# Patient Record
Sex: MALE | Race: NATIVE HAWAIIAN OR OTHER PACIFIC ISLANDER | NOT HISPANIC OR LATINO | Employment: OTHER | URBAN - METROPOLITAN AREA
[De-identification: names, ages, dates, MRNs, and addresses within clinical notes are randomized per-mention and may not be internally consistent; named-entity substitution may affect disease eponyms.]

---

## 2021-05-13 ENCOUNTER — HOSPITAL ENCOUNTER (EMERGENCY)
Facility: MEDICAL CENTER | Age: 31
End: 2021-05-13
Attending: EMERGENCY MEDICINE

## 2021-05-13 VITALS
WEIGHT: 222.66 LBS | HEART RATE: 63 BPM | SYSTOLIC BLOOD PRESSURE: 133 MMHG | TEMPERATURE: 97.4 F | BODY MASS INDEX: 34.95 KG/M2 | OXYGEN SATURATION: 94 % | HEIGHT: 67 IN | RESPIRATION RATE: 16 BRPM | DIASTOLIC BLOOD PRESSURE: 76 MMHG

## 2021-05-13 DIAGNOSIS — T16.1XXA ACUTE FOREIGN BODY OF EARLOBE, RIGHT, INITIAL ENCOUNTER: ICD-10-CM

## 2021-05-13 PROCEDURE — 99282 EMERGENCY DEPT VISIT SF MDM: CPT

## 2021-05-13 PROCEDURE — 10120 INC&RMVL FB SUBQ TISS SMPL: CPT

## 2021-05-13 PROCEDURE — 700101 HCHG RX REV CODE 250: Performed by: EMERGENCY MEDICINE

## 2021-05-13 RX ORDER — LIDOCAINE HYDROCHLORIDE 20 MG/ML
20 INJECTION, SOLUTION INFILTRATION; PERINEURAL ONCE
Status: COMPLETED | OUTPATIENT
Start: 2021-05-13 | End: 2021-05-13

## 2021-05-13 RX ADMIN — LIDOCAINE HYDROCHLORIDE 20 ML: 20 INJECTION, SOLUTION INFILTRATION; PERINEURAL at 12:00

## 2021-05-13 NOTE — ED PROVIDER NOTES
ED Provider Note    CHIEF COMPLAINT  Chief Complaint   Patient presents with   • Ear Pain       HPI  Dionisio Bey III is a 31 y.o. male who presents planing of a foreign body in his right earlobe.  The patient states that he was changing out his earring and pulled the backing into his ear lobe.  He was unable to remove it himself.  He waited a week to see if it would fester out and it is not coming out.  The patient denies any swelling of the earlobe or drainage.  Has no history of diabetes or poor wound healing.    REVIEW OF SYSTEMS  See HPI for further details. All other systems are negative.     PAST MEDICAL HISTORY  No past medical history on file.    FAMILY HISTORY  No family history on file.    SOCIAL HISTORY  Social History     Socioeconomic History   • Marital status: Single     Spouse name: Not on file   • Number of children: Not on file   • Years of education: Not on file   • Highest education level: Not on file   Occupational History   • Not on file   Tobacco Use   • Smoking status: Never Smoker   Substance and Sexual Activity   • Alcohol use: No   • Drug use: No   • Sexual activity: Not on file   Other Topics Concern   • Not on file   Social History Narrative   • Not on file     Social Determinants of Health     Financial Resource Strain:    • Difficulty of Paying Living Expenses:    Food Insecurity:    • Worried About Running Out of Food in the Last Year:    • Ran Out of Food in the Last Year:    Transportation Needs:    • Lack of Transportation (Medical):    • Lack of Transportation (Non-Medical):    Physical Activity:    • Days of Exercise per Week:    • Minutes of Exercise per Session:    Stress:    • Feeling of Stress :    Social Connections:    • Frequency of Communication with Friends and Family:    • Frequency of Social Gatherings with Friends and Family:    • Attends [employer] Services:    • Active Member of Clubs or Organizations:    • Attends Club or Organization Meetings:    • Marital  "Status:    Intimate Partner Violence:    • Fear of Current or Ex-Partner:    • Emotionally Abused:    • Physically Abused:    • Sexually Abused:        SURGICAL HISTORY  No past surgical history on file.    CURRENT MEDICATIONS  Home Medications     Reviewed by Gudelia Mendoza R.N. (Registered Nurse) on 05/13/21 at 1031  Med List Status: Partial   Medication Last Dose Status        Patient Miles Taking any Medications                       ALLERGIES  No Known Allergies    PHYSICAL EXAM  VITAL SIGNS: /87   Pulse 74   Temp 36.1 °C (97 °F) (Temporal)   Resp 16   Ht 1.702 m (5' 7\")   Wt 101 kg (222 lb 10.6 oz)   SpO2 94%   BMI 34.87 kg/m²       Constitutional:  Well developed, Well nourished, No acute distress, Non-toxic appearance.   HENT: Positive foreign body palpated in the right earlobe no erythema or purulent drainage no ear swelling  Eyes: PERRLA, EOMI, Conjunctiva normal, No discharge.   Neck: Normal range of motion, No tenderness, Supple, No stridor.   Lymphatic: No lymphadenopathy noted.   Skin: Warm, Dry, No erythema, No rash.   Extremities: Full range of motion of all extremities without any deformities.   Neurologic: Alert & oriented x 3,No focal deficits noted.   Psychiatric: Affect normal, Judgment normal, Mood normal.       COURSE & MEDICAL DECISION MAKING  Pertinent Labs & Imaging studies reviewed. (See chart for details)  Foreign Body Removal Procedure Note    Indication: Foreign body under the skin    Procedure: The area of the foreign body was prepped with betadine and draped in a sterile fashion. Local anesthesia over the foreign body site was obtained by infiltration using 2% Lidocaine without epinephrine.  The foreign body was then removed using a scalpel and a hemostat and had the appearance of metal ring backing.  After the procedure the area was dressed with a sterile dressing. The patient's tetanus status was up to date and did not require a booster dose.    The patient tolerated " the procedure well.    Complications: None      Vies the patient to wait a few months until the area is completely cleaned to have his ear pierced again.  He should return immediately for any signs of infection.    The patient will return for new or worsening symptoms and is stable at the time of discharge.    The patient is referred to a primary physician for blood pressure management, diabetic screening, and for all other preventative health concerns.      DISPOSITION:  Patient will be discharged home in stable condition.    FOLLOW UP:  No follow-up provider specified.    OUTPATIENT MEDICATIONS:  New Prescriptions    No medications on file         FINAL IMPRESSION  1. Acute foreign body of earlobe, right, initial encounter          Electronically signed by: Alma Madden M.D., 5/13/2021 10:42 AM

## 2021-05-13 NOTE — ED TRIAGE NOTES
Pt ambulates to triage  Chief Complaint   Patient presents with   • Ear Pain   earring back stuck to right ear, earring been in place x 1 week    Pt A & 0 x 4, speech clear, ambulates well    Pt updated on triage process and asked to inform RN of any changes while waiting in lobby.

## 2022-06-10 ENCOUNTER — APPOINTMENT (OUTPATIENT)
Dept: RADIOLOGY | Facility: IMAGING CENTER | Age: 32
End: 2022-06-10
Attending: NURSE PRACTITIONER

## 2022-06-10 ENCOUNTER — OFFICE VISIT (OUTPATIENT)
Dept: URGENT CARE | Facility: CLINIC | Age: 32
End: 2022-06-10
Payer: MEDICAID

## 2022-06-10 VITALS
TEMPERATURE: 98.2 F | OXYGEN SATURATION: 93 % | SYSTOLIC BLOOD PRESSURE: 144 MMHG | RESPIRATION RATE: 18 BRPM | BODY MASS INDEX: 29.55 KG/M2 | HEIGHT: 68 IN | DIASTOLIC BLOOD PRESSURE: 96 MMHG | WEIGHT: 195 LBS | HEART RATE: 77 BPM

## 2022-06-10 DIAGNOSIS — S89.91XA INJURY OF RIGHT KNEE, INITIAL ENCOUNTER: ICD-10-CM

## 2022-06-10 DIAGNOSIS — S86.811A RUPTURE OF RIGHT PATELLAR TENDON, INITIAL ENCOUNTER: ICD-10-CM

## 2022-06-10 PROCEDURE — 73564 X-RAY EXAM KNEE 4 OR MORE: CPT | Mod: TC,FY,RT | Performed by: RADIOLOGY

## 2022-06-10 PROCEDURE — 99203 OFFICE O/P NEW LOW 30 MIN: CPT | Performed by: NURSE PRACTITIONER

## 2022-06-10 NOTE — PROGRESS NOTES
"Subjective     Ottoniel Bey III is a 32 y.o. male who presents with Knee Injury (X4days, Right knee, pain, swelling, bruising, hard to walk on it )            Knee Injury  This is a new problem. Episode onset: pt reports new onset of right knee injury that he sustained 4 days ago while dancing in [place] at a [employer] party. he states he hit his right knee against someone else's knee and this caused pain, swelling and limited mobility.  The problem has been unchanged. Exacerbated by: bending knee. He has tried ice, rest, immobilization and NSAIDs (elevation. using crutches to ambulate) for the symptoms. The treatment provided mild relief.       Review of Systems   Musculoskeletal: Positive for joint pain.   All other systems reviewed and are negative.         History reviewed. No pertinent past medical history. History reviewed. No pertinent surgical history.   Social History     Socioeconomic History   • Marital status: Single     Spouse name: Not on file   • Number of children: Not on file   • Years of education: Not on file   • Highest education level: Not on file   Occupational History   • Not on file   Tobacco Use   • Smoking status: Never Smoker   • Smokeless tobacco: Never Used   Vaping Use   • Vaping Use: Never used   Substance and Sexual Activity   • Alcohol use: Yes     Comment: once a week   • Drug use: No   • Sexual activity: Not on file   Other Topics Concern   • Not on file   Social History Narrative   • Not on file     Social Determinants of Health     Financial Resource Strain: Not on file   Food Insecurity: Not on file   Transportation Needs: Not on file   Physical Activity: Not on file   Stress: Not on file   Social Connections: Not on file   Intimate Partner Violence: Not on file   Housing Stability: Not on file         Objective     BP (!) 144/96   Pulse 77   Temp 36.8 °C (98.2 °F) (Temporal)   Resp 18   Ht 1.727 m (5' 8\")   Wt 88.5 kg (195 lb)   SpO2 93%   BMI 29.65 kg/m²      Physical " Exam  Vitals and nursing note reviewed.   Constitutional:       Appearance: Normal appearance.   HENT:      Head: Normocephalic and atraumatic.      Nose: Nose normal.      Mouth/Throat:      Mouth: Mucous membranes are moist.      Pharynx: Oropharynx is clear.   Eyes:      Extraocular Movements: Extraocular movements intact.      Pupils: Pupils are equal, round, and reactive to light.   Cardiovascular:      Rate and Rhythm: Normal rate and regular rhythm.   Pulmonary:      Effort: Pulmonary effort is normal.   Musculoskeletal:         General: Normal range of motion.      Cervical back: Normal range of motion and neck supple.      Right knee: Effusion present. Tenderness present over the medial joint line.   Skin:     General: Skin is warm and dry.      Capillary Refill: Capillary refill takes less than 2 seconds.   Neurological:      General: No focal deficit present.      Mental Status: He is alert and oriented to person, place, and time. Mental status is at baseline.   Psychiatric:         Mood and Affect: Mood normal.         Thought Content: Thought content normal.         Judgment: Judgment normal.                  DX-KNEE COMPLETE 4+ RIGHT  Narrative: HISTORY/REASON FOR EXAM:  Pain/Deformity Following Trauma    TECHNIQUE/EXAM DESCRIPTION AND NUMBER OF VIEWS:  4 views of the left knee, 6/10/2022 12:44 PM.    COMPARISON: None    FINDINGS:    The patella is positioned superiorly. There is infrapatellar soft tissue swelling. There is probable joint effusion. No fracture identified.  Impression: 1. High positioning of the patella with infrapatellar soft tissue swelling suggesting patellar tendon tear/rupture.    INTERPRETING LOCATION:  [address] [place] [address] [place], [id number]               Assessment & Plan           1. Injury of right knee, initial encounter  - DX-KNEE COMPLETE 4+ RIGHT; Future    2. Rupture of right patellar tendon, initial encounter  - Referral to establish with [facility] PCP    Discussed with patient xray  findings  He will need to follow up with Orthopedic surgeon. Since he does not have insurance, he can go to any ortho specialist  Continue to wear knee brace and utilize crutches  RICE   Tylenol and ibuprofen as needed for pain       Discussed elevated BP with patient today. Patient agrees with plan to institute proper dietary modifications to lower BP.   Referral placed to establish care with a PCP  Follow up with PCP    Supportive care, differential diagnoses, and indications for immediate follow-up discussed with patient.    Pathogenesis of diagnosis discussed including typical length and natural progression.      Instructed to return to  or nearest emergency department if symptoms fail to improve, for any change in condition, further concerns, or new concerning symptoms.  Patient states understanding of the plan of care and discharge instructions.

## 2022-06-14 ENCOUNTER — ANESTHESIA EVENT (OUTPATIENT)
Dept: SURGERY | Facility: MEDICAL CENTER | Age: 32
End: 2022-06-14
Payer: MEDICAID

## 2022-06-14 ENCOUNTER — HOSPITAL ENCOUNTER (OUTPATIENT)
Facility: MEDICAL CENTER | Age: 32
End: 2022-06-14
Attending: ORTHOPAEDIC SURGERY | Admitting: ORTHOPAEDIC SURGERY
Payer: MEDICAID

## 2022-06-14 ENCOUNTER — ANESTHESIA (OUTPATIENT)
Dept: SURGERY | Facility: MEDICAL CENTER | Age: 32
End: 2022-06-14
Payer: MEDICAID

## 2022-06-14 VITALS
SYSTOLIC BLOOD PRESSURE: 152 MMHG | BODY MASS INDEX: 31.14 KG/M2 | HEIGHT: 67 IN | OXYGEN SATURATION: 93 % | TEMPERATURE: 97.5 F | HEART RATE: 54 BPM | RESPIRATION RATE: 16 BRPM | DIASTOLIC BLOOD PRESSURE: 76 MMHG | WEIGHT: 198.41 LBS

## 2022-06-14 PROCEDURE — 160036 HCHG PACU - EA ADDL 30 MINS PHASE I: Performed by: ORTHOPAEDIC SURGERY

## 2022-06-14 PROCEDURE — 160029 HCHG SURGERY MINUTES - 1ST 30 MINS LEVEL 4: Performed by: ORTHOPAEDIC SURGERY

## 2022-06-14 PROCEDURE — 160025 RECOVERY II MINUTES (STATS): Performed by: ORTHOPAEDIC SURGERY

## 2022-06-14 PROCEDURE — 160009 HCHG ANES TIME/MIN: Performed by: ORTHOPAEDIC SURGERY

## 2022-06-14 PROCEDURE — 700111 HCHG RX REV CODE 636 W/ 250 OVERRIDE (IP): Performed by: ANESTHESIOLOGY

## 2022-06-14 PROCEDURE — 160046 HCHG PACU - 1ST 60 MINS PHASE II: Performed by: ORTHOPAEDIC SURGERY

## 2022-06-14 PROCEDURE — 160035 HCHG PACU - 1ST 60 MINS PHASE I: Performed by: ORTHOPAEDIC SURGERY

## 2022-06-14 PROCEDURE — 01320 ANES ALL PX NRV MSC KNE&/POP: CPT | Performed by: ANESTHESIOLOGY

## 2022-06-14 PROCEDURE — 160041 HCHG SURGERY MINUTES - EA ADDL 1 MIN LEVEL 4: Performed by: ORTHOPAEDIC SURGERY

## 2022-06-14 PROCEDURE — 700101 HCHG RX REV CODE 250: Performed by: ANESTHESIOLOGY

## 2022-06-14 PROCEDURE — 700111 HCHG RX REV CODE 636 W/ 250 OVERRIDE (IP): Performed by: ORTHOPAEDIC SURGERY

## 2022-06-14 PROCEDURE — 160002 HCHG RECOVERY MINUTES (STAT): Performed by: ORTHOPAEDIC SURGERY

## 2022-06-14 PROCEDURE — 700105 HCHG RX REV CODE 258: Performed by: ORTHOPAEDIC SURGERY

## 2022-06-14 PROCEDURE — 160048 HCHG OR STATISTICAL LEVEL 1-5: Performed by: ORTHOPAEDIC SURGERY

## 2022-06-14 RX ORDER — SODIUM CHLORIDE, SODIUM LACTATE, POTASSIUM CHLORIDE, CALCIUM CHLORIDE 600; 310; 30; 20 MG/100ML; MG/100ML; MG/100ML; MG/100ML
INJECTION, SOLUTION INTRAVENOUS CONTINUOUS
Status: CANCELLED | OUTPATIENT
Start: 2022-06-14 | End: 2022-06-14

## 2022-06-14 RX ORDER — KETOROLAC TROMETHAMINE 30 MG/ML
INJECTION, SOLUTION INTRAMUSCULAR; INTRAVENOUS PRN
Status: DISCONTINUED | OUTPATIENT
Start: 2022-06-14 | End: 2022-06-14 | Stop reason: SURG

## 2022-06-14 RX ORDER — BUPIVACAINE HYDROCHLORIDE 2.5 MG/ML
INJECTION, SOLUTION EPIDURAL; INFILTRATION; INTRACAUDAL
Status: DISCONTINUED | OUTPATIENT
Start: 2022-06-14 | End: 2022-06-14 | Stop reason: HOSPADM

## 2022-06-14 RX ORDER — ONDANSETRON 2 MG/ML
4 INJECTION INTRAMUSCULAR; INTRAVENOUS
Status: DISCONTINUED | OUTPATIENT
Start: 2022-06-14 | End: 2022-06-14 | Stop reason: HOSPADM

## 2022-06-14 RX ORDER — HALOPERIDOL 5 MG/ML
1 INJECTION INTRAMUSCULAR
Status: DISCONTINUED | OUTPATIENT
Start: 2022-06-14 | End: 2022-06-14 | Stop reason: HOSPADM

## 2022-06-14 RX ORDER — OXYCODONE HCL 5 MG/5 ML
10 SOLUTION, ORAL ORAL
Status: DISCONTINUED | OUTPATIENT
Start: 2022-06-14 | End: 2022-06-14 | Stop reason: HOSPADM

## 2022-06-14 RX ORDER — DIPHENHYDRAMINE HYDROCHLORIDE 50 MG/ML
12.5 INJECTION INTRAMUSCULAR; INTRAVENOUS
Status: DISCONTINUED | OUTPATIENT
Start: 2022-06-14 | End: 2022-06-14 | Stop reason: HOSPADM

## 2022-06-14 RX ORDER — ONDANSETRON 2 MG/ML
INJECTION INTRAMUSCULAR; INTRAVENOUS PRN
Status: DISCONTINUED | OUTPATIENT
Start: 2022-06-14 | End: 2022-06-14 | Stop reason: SURG

## 2022-06-14 RX ORDER — MEPERIDINE HYDROCHLORIDE 25 MG/ML
12.5 INJECTION INTRAMUSCULAR; INTRAVENOUS; SUBCUTANEOUS
Status: DISCONTINUED | OUTPATIENT
Start: 2022-06-14 | End: 2022-06-14 | Stop reason: HOSPADM

## 2022-06-14 RX ORDER — HYDROMORPHONE HYDROCHLORIDE 1 MG/ML
0.2 INJECTION, SOLUTION INTRAMUSCULAR; INTRAVENOUS; SUBCUTANEOUS
Status: DISCONTINUED | OUTPATIENT
Start: 2022-06-14 | End: 2022-06-14 | Stop reason: HOSPADM

## 2022-06-14 RX ORDER — HYDROMORPHONE HYDROCHLORIDE 1 MG/ML
0.1 INJECTION, SOLUTION INTRAMUSCULAR; INTRAVENOUS; SUBCUTANEOUS
Status: DISCONTINUED | OUTPATIENT
Start: 2022-06-14 | End: 2022-06-14 | Stop reason: HOSPADM

## 2022-06-14 RX ORDER — HYDROMORPHONE HYDROCHLORIDE 1 MG/ML
0.4 INJECTION, SOLUTION INTRAMUSCULAR; INTRAVENOUS; SUBCUTANEOUS
Status: DISCONTINUED | OUTPATIENT
Start: 2022-06-14 | End: 2022-06-14 | Stop reason: HOSPADM

## 2022-06-14 RX ORDER — SODIUM CHLORIDE, SODIUM LACTATE, POTASSIUM CHLORIDE, CALCIUM CHLORIDE 600; 310; 30; 20 MG/100ML; MG/100ML; MG/100ML; MG/100ML
INJECTION, SOLUTION INTRAVENOUS CONTINUOUS
Status: ACTIVE | OUTPATIENT
Start: 2022-06-14 | End: 2022-06-14

## 2022-06-14 RX ORDER — OXYCODONE HCL 5 MG/5 ML
5 SOLUTION, ORAL ORAL
Status: DISCONTINUED | OUTPATIENT
Start: 2022-06-14 | End: 2022-06-14 | Stop reason: HOSPADM

## 2022-06-14 RX ORDER — CEFAZOLIN SODIUM 1 G/3ML
INJECTION, POWDER, FOR SOLUTION INTRAMUSCULAR; INTRAVENOUS PRN
Status: DISCONTINUED | OUTPATIENT
Start: 2022-06-14 | End: 2022-06-14 | Stop reason: SURG

## 2022-06-14 RX ORDER — METOCLOPRAMIDE HYDROCHLORIDE 5 MG/ML
INJECTION INTRAMUSCULAR; INTRAVENOUS PRN
Status: DISCONTINUED | OUTPATIENT
Start: 2022-06-14 | End: 2022-06-14 | Stop reason: SURG

## 2022-06-14 RX ORDER — ALBUTEROL SULFATE 2.5 MG/3ML
2.5 SOLUTION RESPIRATORY (INHALATION)
Status: DISCONTINUED | OUTPATIENT
Start: 2022-06-14 | End: 2022-06-14 | Stop reason: HOSPADM

## 2022-06-14 RX ORDER — DEXAMETHASONE SODIUM PHOSPHATE 4 MG/ML
INJECTION, SOLUTION INTRA-ARTICULAR; INTRALESIONAL; INTRAMUSCULAR; INTRAVENOUS; SOFT TISSUE PRN
Status: DISCONTINUED | OUTPATIENT
Start: 2022-06-14 | End: 2022-06-14 | Stop reason: SURG

## 2022-06-14 RX ADMIN — SODIUM CHLORIDE, POTASSIUM CHLORIDE, SODIUM LACTATE AND CALCIUM CHLORIDE: 600; 310; 30; 20 INJECTION, SOLUTION INTRAVENOUS at 14:55

## 2022-06-14 RX ADMIN — METOCLOPRAMIDE 10 MG: 5 INJECTION, SOLUTION INTRAMUSCULAR; INTRAVENOUS at 15:21

## 2022-06-14 RX ADMIN — MIDAZOLAM 2 MG: 1 INJECTION INTRAMUSCULAR; INTRAVENOUS at 15:22

## 2022-06-14 RX ADMIN — KETOROLAC TROMETHAMINE 30 MG: 30 INJECTION, SOLUTION INTRAMUSCULAR at 15:21

## 2022-06-14 RX ADMIN — DEXAMETHASONE SODIUM PHOSPHATE 8 MG: 4 INJECTION, SOLUTION INTRA-ARTICULAR; INTRALESIONAL; INTRAMUSCULAR; INTRAVENOUS; SOFT TISSUE at 15:21

## 2022-06-14 RX ADMIN — PROPOFOL 150 MG: 10 INJECTION, EMULSION INTRAVENOUS at 15:21

## 2022-06-14 RX ADMIN — GLYCOPYRROLATE 0.2 MG: 0.2 INJECTION INTRAMUSCULAR; INTRAVENOUS at 15:53

## 2022-06-14 RX ADMIN — FENTANYL CITRATE 100 MCG: 50 INJECTION, SOLUTION INTRAMUSCULAR; INTRAVENOUS at 15:49

## 2022-06-14 RX ADMIN — FENTANYL CITRATE 50 MCG: 50 INJECTION, SOLUTION INTRAMUSCULAR; INTRAVENOUS at 17:09

## 2022-06-14 RX ADMIN — CEFAZOLIN 2 G: 330 INJECTION, POWDER, FOR SOLUTION INTRAMUSCULAR; INTRAVENOUS at 15:21

## 2022-06-14 RX ADMIN — ONDANSETRON 4 MG: 2 INJECTION INTRAMUSCULAR; INTRAVENOUS at 15:21

## 2022-06-14 RX ADMIN — FENTANYL CITRATE 100 MCG: 50 INJECTION, SOLUTION INTRAMUSCULAR; INTRAVENOUS at 15:43

## 2022-06-14 RX ADMIN — FENTANYL CITRATE 100 MCG: 50 INJECTION, SOLUTION INTRAMUSCULAR; INTRAVENOUS at 15:28

## 2022-06-14 ASSESSMENT — PAIN DESCRIPTION - PAIN TYPE
TYPE: SURGICAL PAIN
TYPE: ACUTE PAIN

## 2022-06-14 ASSESSMENT — PAIN SCALES - GENERAL: PAIN_LEVEL: 1

## 2022-06-14 NOTE — ANESTHESIA TIME REPORT
Anesthesia Start and Stop Event Times     Date Time Event    6/14/2022 1453 Ready for Procedure     1517 Anesthesia Start     1644 Anesthesia Stop        Responsible Staff  06/14/22    Name Role Begin End    Edvin Nelson M.D. Anesth 1517 1644        Overtime Reason:  no overtime (within assigned shift)    Comments:

## 2022-06-14 NOTE — ANESTHESIA POSTPROCEDURE EVALUATION
Patient: Dionisio Bey III    Procedure Summary     Date: 06/14/22 Room / Location:  OR 06 / SURGERY [facility]    Anesthesia Start: 1517 Anesthesia Stop: 1644    Procedure: REPAIR, TENDON, PATELLAR AND QUADRICEPS - KNEE (Right Knee) Diagnosis: (RUPTURE OF PATELLAR TENDON)    Surgeons: Maryam Clancy M.D. Responsible Provider: Edvin Nelson M.D.    Anesthesia Type: general ASA Status: 1          Final Anesthesia Type: general  Last vitals  BP   Blood Pressure: (!) 152/76    Temp   37 °C (98.6 °F)    Pulse   61   Resp   20    SpO2   96 %      Anesthesia Post Evaluation    Patient location during evaluation: PACU  Patient participation: complete - patient participated  Level of consciousness: awake and alert  Pain score: 1    Airway patency: patent  Anesthetic complications: no  Cardiovascular status: hemodynamically stable  Respiratory status: acceptable  Hydration status: euvolemic    PONV: none          No complications documented.     Nurse Pain Score: 2 (NPRS)

## 2022-06-14 NOTE — OR NURSING
1643 PT RECEIVED IN PACU, REPORT RECEIVED.  VSS, RESP SPONT, EVEN, NON LABORED. PT  ARRIVES IN PRONE POSITION PER PT.     1621 PT REPOSITION TO SUPINE WITH HOB ELEVATED. PT AWAKE, REPORT R KNEE PAIN 7/10. PLAN ANALGESIA.     1731 PT REPORTS R KNEE PIAN 5/10 AND TOLERABLE. DISCUSS ORAL ANALGESIA. PT DECLINES MEDICATION NOW.  WILL TAKE HOME MEDICATION.    1755 VSS. PT MEETS CRITERIA FOR TRANSFER TO STAGE 2.

## 2022-06-14 NOTE — ANESTHESIA PROCEDURE NOTES
Airway    Date/Time: 6/14/2022 3:23 PM  Performed by: Edvin Nelson M.D.  Authorized by: Edvin Nelson M.D.     Location:  OR  Urgency:  Elective  Indications for Airway Management:  Anesthesia      Spontaneous Ventilation: absent    Sedation Level:  Deep  Preoxygenated: Yes    Final Airway Type:  Supraglottic airway  Final Supraglottic Airway:  Standard LMA    SGA Size:  3  Number of Attempts at Approach:  1

## 2022-06-14 NOTE — ANESTHESIA PREPROCEDURE EVALUATION
Case: [id number] Date/Time: 06/14/22 1406    Procedure: REPAIR, TENDON, PATELLAR AND QUADRICEPS - KNEE (Right )    Pre-op diagnosis: RUPTURE OF PATELLAR TENDON    Location: [facility] OR 06 / SURGERY [facility]    Surgeons: Maryam Clancy M.D.          Relevant Problems   No relevant active problems       Physical Exam    Airway   Mallampati: II  TM distance: >3 FB  Neck ROM: full       Cardiovascular - normal exam  Rhythm: regular  Rate: normal  (-) murmur     Dental - normal exam           Pulmonary - normal exam  Breath sounds clear to auscultation     Abdominal    Neurological - normal exam                 Anesthesia Plan    ASA 1       Plan - general       Airway plan will be LMA          Induction: intravenous    Postoperative Plan: Postoperative administration of opioids is intended.    Pertinent diagnostic labs and testing reviewed    Informed Consent:    Anesthetic plan and risks discussed with patient.    Use of blood products discussed with: patient whom consented to blood products.

## 2022-06-15 NOTE — OR NURSING
1800 Patient to phase 2 from PACU after report received      Patient dressed with help from CNA     1824 Patient education completed, family denies further questions.    1830 DC'd to care of family post uneventful stay in PACU 2.
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
## 2022-06-15 NOTE — PROGRESS NOTES
0911-post op phone call attempted. No answer I left a message and explained that if he has any concerns to call his doctor.

## 2022-06-15 NOTE — DISCHARGE INSTRUCTIONS
ACTIVITY: Rest and take it easy for the first 24 hours.  A responsible adult is recommended to remain with you during that time.  It is normal to feel sleepy.  We encourage you to not do anything that requires balance, judgment or coordination.    MILD FLU-LIKE SYMPTOMS ARE NORMAL. YOU MAY EXPERIENCE GENERALIZED MUSCLE ACHES, THROAT IRRITATION, HEADACHE AND/OR SOME NAUSEA.    FOR 24 HOURS DO NOT:  Drive, operate machinery or run household appliances.  Drink beer or alcoholic beverages.   Make important decisions or sign legal documents.    SPECIAL INSTRUCTIONS: ACTIVITY AS TOLERATED WITH IMMOBILIZER ON.  WEIGHT BEARING AS TOLERATED.      DIET: To avoid nausea, slowly advance diet as tolerated, avoiding spicy or greasy foods for the first day.  Add more substantial food to your diet according to your physician's instructions.  Babies can be fed formula or breast milk as soon as they are hungry.  INCREASE FLUIDS AND FIBER TO AVOID CONSTIPATION.    SURGICAL DRESSING/BATHING: KEEP DRESSING CLEAN AND DRY.  YOU MAY SHOWER WITH WOUND COVERED BEGINNING ON POST OPERATIVE DAY #2 (Thursday)    FOLLOW-UP APPOINTMENT:  A follow-up appointment should be arranged with your doctor ; call to schedule.    You should CALL YOUR PHYSICIAN if you develop:  Fever greater than 101 degrees F.  Pain not relieved by medication, or persistent nausea or vomiting.  Excessive bleeding (blood soaking through dressing) or unexpected drainage from the wound.  Extreme redness or swelling around the incision site, drainage of pus or foul smelling drainage.  Problems with breathing or chest pain.    You should call 911 if you develop problems with breathing or chest pain.  If you are unable to contact your doctor or surgical center, you should go to the nearest emergency room or urgent care center.      Physician's telephone #: DR. GRIFFIN  [phone]    If any questions arise, call your doctor.  If your doctor is not available, please feel free to  call the Surgical Center at [phone].     A registered nurse may call you a few days after your surgery to see how you are doing after your procedure.    MEDICATIONS: Resume taking daily medication.  Take prescribed pain medication with food.  If no medication is prescribed, you may take non-aspirin pain medication if needed.  PAIN MEDICATION CAN BE VERY CONSTIPATING.  Take a stool softener or laxative such as senokot, pericolace, or milk of magnesia if needed.    Prescription given PRE OPERATIVELY.      NO ORAL PAIN MEDICATION  GIVEN IN RECOVERY.    If your physician has prescribed pain medication that includes Acetaminophen (Tylenol), do not take additional Acetaminophen (Tylenol) while taking the prescribed medication.    Depression / Suicide Risk    As you are discharged from this Critical access hospital facility, it is important to learn how to keep safe from harming yourself.    Recognize the warning signs:  Abrupt changes in personality, positive or negative- including increase in energy   Giving away possessions  Change in eating patterns- significant weight changes-  positive or negative  Change in sleeping patterns- unable to sleep or sleeping all the time   Unwillingness or inability to communicate  Depression  Unusual sadness, discouragement and loneliness  Talk of wanting to die  Neglect of personal appearance   Rebelliousness- reckless behavior  Withdrawal from people/activities they love  Confusion- inability to concentrate     If you or a loved one observes any of these behaviors or has concerns about self-harm, here's what you can do:  Talk about it- your feelings and reasons for harming yourself  Remove any means that you might use to hurt yourself (examples: pills, rope, extension cords, firearm)  Get professional help from the community (Mental Health, Substance Abuse, psychological counseling)  Do not be alone:Call your Safe Contact- someone whom you trust who will be there for you.  Call your local  CRISIS HOTLINE 304-2939 or 553-944-2298  Call your local Children's Mobile Crisis Response Team [place] (482) 957-6289 or www.Moped.LiveStories  Call the toll free National Suicide Prevention Hotlines   National Suicide Prevention Lifeline 844-280-NSLO (2522)  National Hope Line Network 800-SUICIDE (128-9010)

## (undated) DEVICE — HEAD HOLDER JUNIOR/ADULT

## (undated) DEVICE — PACK LOWER EXTREMITY - (2/CA)

## (undated) DEVICE — GLOVE, LITE (PAIR)

## (undated) DEVICE — CANISTER SUCTION 3000ML MECHANICAL FILTER AUTO SHUTOFF MEDI-VAC NONSTERILE LF DISP  (40EA/CA)

## (undated) DEVICE — HUMID-VENT HEAT AND MOISTURE EXCHANGE- (50/BX)

## (undated) DEVICE — SUCTION INSTRUMENT YANKAUER BULBOUS TIP W/O VENT (50EA/CA)

## (undated) DEVICE — SENSOR SPO2 NEO LNCS ADHESIVE (20/BX) SEE USER NOTES

## (undated) DEVICE — SODIUM CHL IRRIGATION 0.9% 1000ML (12EA/CA)

## (undated) DEVICE — CHLORAPREP 26 ML APPLICATOR - ORANGE TINT(25/CA)

## (undated) DEVICE — PROTECTOR ULNA NERVE - (36PR/CA)

## (undated) DEVICE — SUTURE 2-0 VICRYL PLUS CT-1 - 8 X 18 INCH(12/BX)

## (undated) DEVICE — FILTER BLOOD TRANSFUSION - (40/CA) (PALL)

## (undated) DEVICE — BAG SPONGE COUNT 10.25 X 32 - BLUE (250/CA)

## (undated) DEVICE — SUTURE 0 VICRYL PLUS CT-1 - 36 INCH (36/BX)

## (undated) DEVICE — SET EXTENSION WITH 2 PORTS (48EA/CA) ***PART #2C8610 IS A SUBSTITUTE*****

## (undated) DEVICE — WRAP COBAN SELF-ADHERENT 6 IN X  5YDS STERILE TAN (12/CA)

## (undated) DEVICE — KIT ANESTHESIA W/CIRCUIT & 3/LT BAG W/FILTER (20EA/CA)

## (undated) DEVICE — NEPTUNE 4 PORT MANIFOLD - (20/PK)

## (undated) DEVICE — SUTURE GENERAL

## (undated) DEVICE — ELECTRODE DUAL RETURN W/ CORD - (50/PK)

## (undated) DEVICE — STAPLER SKIN DISP - (6/BX 10BX/CA) VISISTAT

## (undated) DEVICE — MASK ANESTHESIA ADULT  - (100/CA)

## (undated) DEVICE — TUBE CONNECTING SUCTION - CLEAR PLASTIC STERILE 72 IN (50EA/CA)

## (undated) DEVICE — LACTATED RINGERS INJ 1000 ML - (14EA/CA 60CA/PF)

## (undated) DEVICE — SENSOR OXIMETER ADULT SPO2 RD SET (20EA/BX)

## (undated) DEVICE — ELECTRODE 850 FOAM ADHESIVE - HYDROGEL RADIOTRNSPRNT (50/PK)

## (undated) DEVICE — TUBING CLEARLINK DUO-VENT - C-FLO (48EA/CA)

## (undated) DEVICE — CANISTER SUCTION RIGID RED 1500CC (40EA/CA)

## (undated) DEVICE — SUTURE 3-0 MONOCRYL PLUS PS-1 - 27 INCH (36/BX)

## (undated) DEVICE — TOWEL STOP TIMEOUT SAFETY FLAG (40EA/CA)

## (undated) DEVICE — DRAPE LARGE 3 QUARTER - (20/CA)

## (undated) DEVICE — GOWN WARMING STANDARD FLEX - (30/CA)

## (undated) DEVICE — WATER IRRIGATION STERILE 1000ML (12EA/CA)